# Patient Record
Sex: FEMALE | Race: WHITE | Employment: UNEMPLOYED | URBAN - METROPOLITAN AREA
[De-identification: names, ages, dates, MRNs, and addresses within clinical notes are randomized per-mention and may not be internally consistent; named-entity substitution may affect disease eponyms.]

---

## 2017-02-28 ENCOUNTER — OFFICE VISIT (OUTPATIENT)
Dept: PSYCHIATRY | Facility: CLINIC | Age: 52
End: 2017-02-28
Payer: MEDICAID

## 2017-02-28 DIAGNOSIS — F25.0 SCHIZOAFFECTIVE DISORDER, BIPOLAR TYPE (H): Primary | ICD-10-CM

## 2017-02-28 PROCEDURE — 99215 OFFICE O/P EST HI 40 MIN: CPT | Performed by: PSYCHIATRY & NEUROLOGY

## 2017-02-28 RX ORDER — RISPERIDONE 1 MG/1
TABLET ORAL
Qty: 60 TABLET | Refills: 3 | Status: SHIPPED | OUTPATIENT
Start: 2017-02-28 | End: 2017-07-06

## 2017-02-28 RX ORDER — LAMOTRIGINE 100 MG/1
TABLET ORAL
Qty: 60 TABLET | Refills: 6 | Status: SHIPPED | OUTPATIENT
Start: 2017-02-28 | End: 2017-12-27

## 2017-02-28 RX ORDER — LAMOTRIGINE 25 MG/1
TABLET ORAL
Qty: 42 TABLET | Refills: 0 | Status: SHIPPED | OUTPATIENT
Start: 2017-02-28 | End: 2017-06-07 | Stop reason: DRUGHIGH

## 2017-02-28 ASSESSMENT — ANXIETY QUESTIONNAIRES
6. BECOMING EASILY ANNOYED OR IRRITABLE: SEVERAL DAYS
2. NOT BEING ABLE TO STOP OR CONTROL WORRYING: NEARLY EVERY DAY
GAD7 TOTAL SCORE: 19
7. FEELING AFRAID AS IF SOMETHING AWFUL MIGHT HAPPEN: NEARLY EVERY DAY
3. WORRYING TOO MUCH ABOUT DIFFERENT THINGS: NEARLY EVERY DAY
5. BEING SO RESTLESS THAT IT IS HARD TO SIT STILL: NEARLY EVERY DAY
1. FEELING NERVOUS, ANXIOUS, OR ON EDGE: NEARLY EVERY DAY

## 2017-02-28 ASSESSMENT — PATIENT HEALTH QUESTIONNAIRE - PHQ9: 5. POOR APPETITE OR OVEREATING: NEARLY EVERY DAY

## 2017-02-28 NOTE — PATIENT INSTRUCTIONS
Treatment Plan:  - Drop Latuda (lurasidone) to 40 mg after evening meal for a week, then stop   - Continue Lexapro (escitalopram) 20 mg per day   - Continue Remeron (mirtazepine) 15 mg at bedtime, but try skipping it once in while to see if you have less daytime sedation.   - Start Lamictal 25 mg every other day for 2 weeks, then 25 mg per day for 2 weeks, then to 50 mg or so. Rash information provided.    - After one week of 100 mg Lamictal, go up to 150 mg for a week, then to 200 mg.   - Risperdal (risperidone) 1 mg; 1 to 2 at bedtime   - Continue Inderal (propranolol) to 80 mg XR   - vitamin D 5,000 IU per day and B12  - Behavioral activation was reviewed.   - Call [facility] at [phone] to schedule psychotherapy    - Safety plan was reviewed; to the ER as needed or call after hours crisis line; [phone]  - Education and counseling was done regarding use of medications, psychotherapy options  - Call for a follow up appointment with Dr. Lozada in about 3 months; [phone].

## 2017-02-28 NOTE — MR AVS SNAPSHOT
"                  MRN:[id number]                      After Visit Summary   2017    Tuan Candelaria    MRN: [id number]           Visit Information        Provider Department      2017 12:30 PM Ladarius Lozada MD [facility]        Care Instructions        Treatment Plan:  - Drop Latuda (lurasidone) to 40 mg after evening meal for a week, then stop   - Continue Lexapro (escitalopram) 20 mg per day   - Continue Remeron (mirtazepine) 15 mg at bedtime, but try skipping it once in while to see if you have less daytime sedation.   - Start Lamictal 25 mg every other day for 2 weeks, then 25 mg per day for 2 weeks, then to 50 mg or so. Rash information provided.    - After one week of 100 mg Lamictal, go up to 150 mg for a week, then to 200 mg.   - Risperdal (risperidone) 1 mg; 1 to 2 at bedtime   - Continue Inderal (propranolol) to 80 mg XR   - vitamin D 5,000 IU per day and B12  - Behavioral activation was reviewed.   - Call [facility] at [phone] to schedule psychotherapy    - Safety plan was reviewed; to the ER as needed or call after hours crisis line; [phone]  - Education and counseling was done regarding use of medications, psychotherapy options  - Call for a follow up appointment with Dr. Lozada in about 3 months; [phone].          MyChart Information     [employer] lets you send messages to your doctor, view your test results, renew your prescriptions, schedule appointments and more. To sign up, go to [email or web address] . Click on \"Log in\" on the left side of the screen, which will take you to the Welcome page. Then click on \"Sign up Now\" on the right side of the page.     You will be asked to enter the access code listed below, as well as some personal information. Please follow the directions to create your username and password.     Your access code is: [email or web address]  Expires: 2017  1:00 PM     Your access code will  in 90 days. " If you need help or a new code, please call your [place] clinic or [phone].        Care EveryWhere ID     This is your Care EveryWhere ID. This could be used by other organizations to access your [place] medical records  [id number]
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
## 2017-03-01 ASSESSMENT — ANXIETY QUESTIONNAIRES: GAD7 TOTAL SCORE: 19

## 2017-03-01 ASSESSMENT — PATIENT HEALTH QUESTIONNAIRE - PHQ9: SUM OF ALL RESPONSES TO PHQ QUESTIONS 1-9: 19

## 2017-03-06 ENCOUNTER — TELEPHONE (OUTPATIENT)
Dept: PSYCHIATRY | Facility: CLINIC | Age: 52
End: 2017-03-06

## 2017-03-06 NOTE — TELEPHONE ENCOUNTER
I spoke to Tuna. It seems like her pharmacy has been filling the old prescription of Propanolol 20 mg. She is going to check with the pharmacy and make sure she is taking the XR 80 mg per the treatment plan. She will call back only if she has problems.       From 02/28/2017 med check:  Assessment:  She was doing better on Lamictal and Risperdal; will go back to them   Needs Behavioral activation  Paperwork for [employer] done (can't handle the bus).      Treatment Plan:  - Drop Latuda (lurasidone) to 40 mg after evening meal for a week, then stop   - Continue Lexapro (escitalopram) 20 mg per day   - Continue Remeron (mirtazepine) 15 mg at bedtime, but try skipping it once in while to see if you have less daytime sedation.   - Start Lamictal 25 mg every other day for 2 weeks, then 25 mg per day for 2 weeks, then to 50 mg or so. Rash information provided.   - After one week of 100 mg Lamictal, go up to 150 mg for a week, then to 200 mg.   - Risperdal (risperidone) 1 mg; 1 to 2 at bedtime   - Continue Inderal (propranolol) to 80 mg XR   - vitamin D 5,000 IU per day and B12  - Behavioral activation was reviewed.   - Call [facility] at [phone] to schedule psychotherapy   - Safety plan was reviewed; to the ER as needed or call after hours crisis line; [phone]  - Education and counseling was done regarding use of medications, psychotherapy options  - Call for a follow up appointment with Dr. Lozada in about 3 months; [phone].            Signed: Ladarius Lozada M.D.

## 2017-06-07 ENCOUNTER — OFFICE VISIT (OUTPATIENT)
Dept: PSYCHIATRY | Facility: CLINIC | Age: 52
End: 2017-06-07
Payer: MEDICARE

## 2017-06-07 DIAGNOSIS — F25.0 SCHIZOAFFECTIVE DISORDER, BIPOLAR TYPE (H): Primary | ICD-10-CM

## 2017-06-07 PROCEDURE — 99214 OFFICE O/P EST MOD 30 MIN: CPT | Performed by: PSYCHIATRY & NEUROLOGY

## 2017-06-07 ASSESSMENT — ANXIETY QUESTIONNAIRES
5. BEING SO RESTLESS THAT IT IS HARD TO SIT STILL: MORE THAN HALF THE DAYS
6. BECOMING EASILY ANNOYED OR IRRITABLE: SEVERAL DAYS
2. NOT BEING ABLE TO STOP OR CONTROL WORRYING: NEARLY EVERY DAY
3. WORRYING TOO MUCH ABOUT DIFFERENT THINGS: NEARLY EVERY DAY
GAD7 TOTAL SCORE: 16
7. FEELING AFRAID AS IF SOMETHING AWFUL MIGHT HAPPEN: NEARLY EVERY DAY
1. FEELING NERVOUS, ANXIOUS, OR ON EDGE: MORE THAN HALF THE DAYS

## 2017-06-07 ASSESSMENT — PATIENT HEALTH QUESTIONNAIRE - PHQ9: 5. POOR APPETITE OR OVEREATING: MORE THAN HALF THE DAYS
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
## 2017-06-07 NOTE — PATIENT INSTRUCTIONS
Treatment Plan:  - Continue Lamictal (lamotrigine) 200 mg per day; call us if you are not taking this amount   - Risperdal (risperidone) 1 mg; 1 to 2 at bedtime along with Remeron (mirtazepine) 15 mg   - Continue Inderal (propranolol) to 80 mg XR and Lexapro (escitalopram) 20 mg per day   - vitamin D 5,000 IU per day and B12  - Behavioral activation was reviewed.   - Call [facility] at [phone] to schedule psychotherapy    - Safety plan was reviewed; to the ER as needed or call after hours crisis line; [phone]  - Education and counseling was done regarding use of medications, psychotherapy options  - Call for a follow up appointment with Dr. Lozada in about 3 months; [phone].

## 2017-06-08 ASSESSMENT — ANXIETY QUESTIONNAIRES: GAD7 TOTAL SCORE: 16

## 2017-06-08 ASSESSMENT — PATIENT HEALTH QUESTIONNAIRE - PHQ9: SUM OF ALL RESPONSES TO PHQ QUESTIONS 1-9: 20

## 2017-06-22 DIAGNOSIS — F31.9 BIPOLAR I DISORDER (H): ICD-10-CM

## 2017-06-22 NOTE — TELEPHONE ENCOUNTER
Medication Detail      Disp Refills Start End AVIVA   escitalopram (LEXAPRO) 20 MG tablet 90 tablet 2 10/25/2016  No   Sig: Take 1 tablet (20 mg) by mouth daily One per day   Class: E-Prescribe   Route: Oral   Order: [id number]       [place] [facility] [place]:  6/7/2017        Last PHQ-9 score on record=   PHQ-9 SCORE 6/7/2017   Total Score 20   Some encounter information is confidential and restricted. Go to Review Flowsheets activity to see all data.

## 2017-06-23 RX ORDER — ESCITALOPRAM OXALATE 20 MG/1
TABLET ORAL
Qty: 90 TABLET | Refills: 1 | Status: SHIPPED | OUTPATIENT
Start: 2017-06-23 | End: 2017-09-28

## 2017-07-06 ENCOUNTER — TELEPHONE (OUTPATIENT)
Dept: PSYCHIATRY | Facility: CLINIC | Age: 52
End: 2017-07-06

## 2017-07-06 DIAGNOSIS — F25.0 SCHIZOAFFECTIVE DISORDER, BIPOLAR TYPE (H): ICD-10-CM

## 2017-07-06 RX ORDER — RISPERIDONE 1 MG/1
TABLET ORAL
Qty: 60 TABLET | Refills: 3 | Status: SHIPPED | OUTPATIENT
Start: 2017-07-06 | End: 2017-09-19

## 2017-07-06 NOTE — TELEPHONE ENCOUNTER
RX was sent per RN protocol.         BP Readings from Last 3 Encounters:   09/12/16 110/62   05/02/16 94/59   03/30/16 93/61     Pulse Readings from Last 2 Encounters:   09/12/16 68   05/02/16 68     Lab Results   Component Value Date     07/03/2016     Lab Results   Component Value Date    WBC 4.2 04/29/2016     Lab Results   Component Value Date    RBC 3.74 04/29/2016     Lab Results   Component Value Date    HGB 12.6 04/29/2016     Lab Results   Component Value Date    HCT 37.3 04/29/2016     No components found for: MCT  Lab Results   Component Value Date     04/29/2016     Lab Results   Component Value Date    MCH 33.7 04/29/2016     Lab Results   Component Value Date    MCHC 33.8 04/29/2016     Lab Results   Component Value Date    RDW 13.5 04/29/2016     Lab Results   Component Value Date     04/29/2016     Lab Results   Component Value Date    CHOL 195 11/10/2011     Lab Results   Component Value Date    HDL 68 11/10/2011     Lab Results   Component Value Date    LDL 79 11/10/2011     Lab Results   Component Value Date    TRIG 212 01/15/2015     Lab Results   Component Value Date    CHOLHDLRATIO 3.0 11/10/2011

## 2017-07-06 NOTE — TELEPHONE ENCOUNTER
Reason for call:  Other   Patient called regarding (reason for call): Pharmacy    Additional comments: Calling to check on the status of Risperdal     Phone number to reach patient:  Other phone number:  Pharmacy # listed above    Best Time:  any    Can we leave a detailed message on this number?  YES

## 2017-09-18 ENCOUNTER — TRANSFERRED RECORDS (OUTPATIENT)
Dept: HEALTH INFORMATION MANAGEMENT | Facility: CLINIC | Age: 52
End: 2017-09-18

## 2017-09-18 ENCOUNTER — TELEPHONE (OUTPATIENT)
Dept: PSYCHIATRY | Facility: CLINIC | Age: 52
End: 2017-09-18

## 2017-09-18 DIAGNOSIS — F25.0 SCHIZOAFFECTIVE DISORDER, BIPOLAR TYPE (H): ICD-10-CM

## 2017-09-18 DIAGNOSIS — F31.9 BIPOLAR I DISORDER (H): ICD-10-CM

## 2017-09-18 LAB — HEP C HIM: NORMAL

## 2017-09-18 NOTE — TELEPHONE ENCOUNTER
Reason for call:  Other   Patient called regarding (reason for call): prescription  Additional comments: [facility] is wanting a refill.  Please call them back.       Phone number to reach patient:  Other phone number:  [phone]    Best Time:  ASAP     Can we leave a detailed message on this number?  YES

## 2017-09-19 RX ORDER — HYDROXYZINE HYDROCHLORIDE 25 MG/1
TABLET, FILM COATED ORAL
Qty: 75 TABLET | Refills: 2 | Status: SHIPPED | OUTPATIENT
Start: 2017-09-19 | End: 2018-08-27

## 2017-09-19 RX ORDER — MIRTAZAPINE 15 MG/1
15 TABLET, FILM COATED ORAL AT BEDTIME
Qty: 90 TABLET | Refills: 0 | Status: SHIPPED | OUTPATIENT
Start: 2017-09-19 | End: 2017-12-27

## 2017-09-19 RX ORDER — RISPERIDONE 1 MG/1
TABLET ORAL
Qty: 60 TABLET | Refills: 2 | Status: SHIPPED | OUTPATIENT
Start: 2017-09-19

## 2017-09-19 RX ORDER — ESCITALOPRAM OXALATE 20 MG/1
20 TABLET ORAL DAILY
Qty: 90 TABLET | Refills: 0 | Status: SHIPPED | OUTPATIENT
Start: 2017-09-19 | End: 2017-12-27

## 2017-09-20 LAB
ALT SERPL-CCNC: 148 IU/L (ref 12–68)
AST SERPL-CCNC: 23 IU/L (ref 12–37)

## 2017-09-21 LAB
CREAT SERPL-MCNC: 1.9 MG/DL (ref 0.55–1.02)
GFR SERPL CREATININE-BSD FRML MDRD: 52 ML/MIN/1.73M2
GLUCOSE SERPL-MCNC: 88 MG/DL (ref 74–106)
POTASSIUM SERPL-SCNC: 4.7 MMOL/L (ref 3.5–5.1)

## 2017-09-22 ENCOUNTER — TELEPHONE (OUTPATIENT)
Dept: FAMILY MEDICINE | Facility: CLINIC | Age: 52
End: 2017-09-22

## 2017-09-22 NOTE — TELEPHONE ENCOUNTER
"ED/Discharge Protocol    \"Hi, my name is Rachelle Edwards Oscar, a registered nurse, and I am calling on behalf of Dr. Vences's office at [place].  I am calling to follow up and see how things are going for you after your recent visit.\"    \"I see that you were in the (ER/UC/IP) on 9/21.    How are you doing now that you are home?\" \"not doing well, but is taking medications as prescribed, she is scared that her delusions will come back.    Is patient experiencing symptoms that may require a hospital visit?  no    Discharge Instructions    \"Let's review your discharge instructions.  What is/are the follow-up recommendations?  Pt. Response: see PCP    \"Were you instructed to make a follow-up appointment?\"  Pt. Response: Yes.  Has appointment been made?   Yes      \"When you see the provider, I would recommend that you bring your discharge instructions with you.    Medications    \"How many new medications are you on since your hospitalization/ED visit?\"    Patient does not know - [facility] MTM referral needed  \"How many of your current medicines changed (dose, timing, name, etc.) while you were in the hospital/ED visit?\"   Patient does not know - Epic MTM referral needed  \"Do you have questions about your medications?\"   Yes (and cannot be easily answered) - Epic MTM referral needed  \"Were you newly diagnosed with heart failure, COPD, diabetes or did you have a heart attack?\"   No  For patients on insulin: \"Did you start on insulin in the hospital or did you have your insulin dose changed?\"   No    Medication reconciliation completed? Attempted, but patient unable to complete on phone - Epic MTM Referral needed     Was MTM referral placed (*Make sure to put transitions as reason for referral)?   Yes - \"The Medication Therapy  will call you within the next few days to schedule an appointment with an MTM pharmacist to discuss your medicines and make sure they are working as well as they possibly can for you. " "This visit can be done in a [place] clinic or on the phone and is at no charge to you.\"    Call Summary    \"Do you have any questions or concerns about your condition or care plan at the moment?\"    No  Triage nurse advice given: call with concerns    Patient was in ER 1 in the past year (assess appropriateness of ER visits.)      \"If you have questions or things don't continue to improve, we encourage you contact us through the main clinic number, [phone].  Even if the clinic is not open, triage nurses are available 24/7 to help you.     We would like you to know that our clinic has extended hours (provide information).  We also have urgent care (provide details on closest location and hours/contact info)\"      \"Thank you for your time and take care!\"          "

## 2017-09-28 ENCOUNTER — OFFICE VISIT (OUTPATIENT)
Dept: PHARMACY | Facility: CLINIC | Age: 52
End: 2017-09-28
Payer: MEDICAID

## 2017-09-28 ENCOUNTER — OFFICE VISIT (OUTPATIENT)
Dept: FAMILY MEDICINE | Facility: CLINIC | Age: 52
End: 2017-09-28
Payer: MEDICARE

## 2017-09-28 VITALS
HEIGHT: 67 IN | TEMPERATURE: 98.5 F | WEIGHT: 173 LBS | HEART RATE: 71 BPM | OXYGEN SATURATION: 99 % | SYSTOLIC BLOOD PRESSURE: 106 MMHG | DIASTOLIC BLOOD PRESSURE: 64 MMHG | BODY MASS INDEX: 27.15 KG/M2

## 2017-09-28 DIAGNOSIS — F25.1 SCHIZOAFFECTIVE DISORDER, DEPRESSIVE TYPE (H): Primary | ICD-10-CM

## 2017-09-28 DIAGNOSIS — Z23 NEED FOR PROPHYLACTIC VACCINATION AND INOCULATION AGAINST INFLUENZA: ICD-10-CM

## 2017-09-28 DIAGNOSIS — N17.9 ACUTE RENAL FAILURE, UNSPECIFIED ACUTE RENAL FAILURE TYPE (H): Primary | ICD-10-CM

## 2017-09-28 DIAGNOSIS — E83.59 HYPOCALCIURIA: ICD-10-CM

## 2017-09-28 DIAGNOSIS — D64.9 ANEMIA, UNSPECIFIED TYPE: ICD-10-CM

## 2017-09-28 DIAGNOSIS — E83.51 HYPOCALCEMIA: ICD-10-CM

## 2017-09-28 DIAGNOSIS — E56.9 VITAMIN DEFICIENCY: ICD-10-CM

## 2017-09-28 DIAGNOSIS — F25.1 SCHIZOAFFECTIVE DISORDER, DEPRESSIVE TYPE (H): ICD-10-CM

## 2017-09-28 LAB
ERYTHROCYTE [DISTWIDTH] IN BLOOD BY AUTOMATED COUNT: 15.3 % (ref 10–15)
HCT VFR BLD AUTO: 33.7 % (ref 35–47)
HGB BLD-MCNC: 11 G/DL (ref 11.7–15.7)
MCH RBC QN AUTO: 32.1 PG (ref 26.5–33)
MCHC RBC AUTO-ENTMCNC: 32.6 G/DL (ref 31.5–36.5)
MCV RBC AUTO: 98 FL (ref 78–100)
PLATELET # BLD AUTO: 178 10E9/L (ref 150–450)
PTH-INTACT SERPL-MCNC: 91 PG/ML (ref 12–72)
RBC # BLD AUTO: 3.43 10E12/L (ref 3.8–5.2)
WBC # BLD AUTO: 3.7 10E9/L (ref 4–11)

## 2017-09-28 PROCEDURE — G0008 ADMIN INFLUENZA VIRUS VAC: HCPCS | Performed by: FAMILY MEDICINE

## 2017-09-28 PROCEDURE — 99606 MTMS BY PHARM EST 15 MIN: CPT | Performed by: PHARMACIST

## 2017-09-28 PROCEDURE — 85027 COMPLETE CBC AUTOMATED: CPT | Performed by: FAMILY MEDICINE

## 2017-09-28 PROCEDURE — 36415 COLL VENOUS BLD VENIPUNCTURE: CPT | Performed by: FAMILY MEDICINE

## 2017-09-28 PROCEDURE — 99607 MTMS BY PHARM ADDL 15 MIN: CPT | Performed by: PHARMACIST

## 2017-09-28 PROCEDURE — 90686 IIV4 VACC NO PRSV 0.5 ML IM: CPT | Performed by: FAMILY MEDICINE

## 2017-09-28 PROCEDURE — 80053 COMPREHEN METABOLIC PANEL: CPT | Performed by: FAMILY MEDICINE

## 2017-09-28 PROCEDURE — 83970 ASSAY OF PARATHORMONE: CPT | Performed by: FAMILY MEDICINE

## 2017-09-28 PROCEDURE — 99495 TRANSJ CARE MGMT MOD F2F 14D: CPT | Mod: 25 | Performed by: FAMILY MEDICINE

## 2017-09-28 RX ORDER — CALCIUM CARBONATE 500 MG/1
1 TABLET, CHEWABLE ORAL DAILY
COMMUNITY

## 2017-09-28 NOTE — MR AVS SNAPSHOT
After Visit Summary   9/28/2017    Tuan Candelaria    MRN: [id number]           Patient Information     Date Of Birth          1965        Visit Information        Provider Department      9/28/2017 1:00 PM Theresa Salazar DO [facility]        Today's Diagnoses     Acute renal failure, unspecified acute renal failure type (H)    -  1    Anemia, unspecified type        Hypocalciuria        Need for prophylactic vaccination and inoculation against influenza        Schizoaffective disorder, depressive type (H)          Care Instructions    Eat when you wake up, and try to eat 3 meals a day.     Grocery shop today, try to walk Monday, Wednesday, and Friday. Try to begin a schedule.     Follow up in 2 weeks for recheck.             Follow-ups after your visit        Your next 10 appointments already scheduled     Oct 13, 2017 10:00 AM CDT   Return Visit with Ladarius Lozada MD   [facility] ([facility])    [address] [facility] [address] [phone]              Who to contact     If you have questions or need follow up information about today's clinic visit or your schedule please contact [facility] directly at [phone].  Normal or non-critical lab and imaging results will be communicated to you by MyChart, letter or phone within 4 business days after the clinic has received the results. If you do not hear from us within 7 days, please contact the clinic through MyChart or phone. If you have a critical or abnormal lab result, we will notify you by phone as soon as possible.  Submit refill requests through [employer] or call your pharmacy and they will forward the refill request to us. Please allow 3 business days for your refill to be completed.          Additional Information About Your Visit        [employer] Information     [employer] lets you send messages to your doctor, view your test  "results, renew your prescriptions, schedule appointments and more. To sign up, go to [email or web address] . Click on \"Log in\" on the left side of the screen, which will take you to the Welcome page. Then click on \"Sign up Now\" on the right side of the page.     You will be asked to enter the access code listed below, as well as some personal information. Please follow the directions to create your username and password.     Your access code is: [email or web address]  Expires: 2017  3:25 PM     Your access code will  in 90 days. If you need help or a new code, please call your [facility] clinic or [phone].        Care EveryWhere ID     This is your Care EveryWhere ID. This could be used by other organizations to access your [facility] medical records  [id number]        Your Vitals Were     Pulse Temperature Height Pulse Oximetry BMI (Body Mass Index)       71 98.5  F (36.9  C) (Oral) 5' 7\" (1.702 m) 99% 27.1 kg/m2        Blood Pressure from Last 3 Encounters:   17 106/64   16 110/62   16 (!) 173/107    Weight from Last 3 Encounters:   17 173 lb (78.5 kg)   16 148 lb 8 oz (67.4 kg)   16 164 lb 9.6 oz (74.7 kg)              We Performed the Following     ADMIN INFLUENZA (For MEDICARE Patients ONLY) []     CBC with platelets     Comprehensive metabolic panel     FLU VAC, SPLIT VIRUS IM > 3 YO (QUADRIVALENT) [44680]     Parathyroid Hormone Intact        Primary Care Provider Office Phone # Fax #    Theresa DO Martin [phone] [address] [facility] [address]        Goals        General    I will attend an Emotions Anonymous mtg within the next 2 weeks  (pt-stated)     Notes - Note edited  2015  5:34 PM by Tameka Negrete    As of today's date 2015 goal is met at 0 - 25%.   Goal Status:  Discontinued    Patient was discharged from [facility] to [facility] in [place], [place]   Tameka " JORDI Kay, [facility]   [phone]  1/8/2015 5:34 PM          I will call Emotions Anonymous today to inquire on other meeting locations  (pt-stated)     Notes - Note edited  1/8/2015  5:33 PM by Tameka Negrete    As of today's date 1/8/2015 goal is met at 26 - 50%.   Goal Status:  Discontinued  Patient was discharged from [facility] ED to [facility] program in [place], [place]   JORDI Stanton, [facility]   [phone]  1/8/2015 5:33 PM        I will read three times weekly or ten minutes  (pt-stated)     Notes - Note edited  1/8/2015  5:33 PM by Tameka Negrete    As of today's date 1/8/2015 goal is met at 0 - 25%.   Goal Status:  Discontinued  Patient was discharged from [facility] ED to [facility] program in [place], [place]   JORDI Stanton, [facility]   [phone]  1/8/2015 5:33 PM          I will schedule appointments within the next week with clinic and Dr. Lozada  (pt-stated)     Notes - Note edited  1/8/2015  5:32 PM by Tameka Negrete    As of today's date 1/8/2015 goal is met at 0 - 25%.   Goal Status:  Discontinued  Patient was discharged from [facility] ED to [facility] program in [place], [place]   JORDI Stanton, [facility]   [phone]  1/8/2015 5:32 PM        I will spend time weekly with friends for emotional support   (pt-stated)     Notes - Note edited  1/8/2015  5:32 PM by Tameka Negrete    As of today's date 1/8/2015 goal is met at 26 - 50%.   Goal Status:  Discontinued  Patient was discharged from [facility] to [facility] program in [place], [place]     JORDI Stanton, [facility]   [phone]  1/8/2015 5:32 PM              I will work on [employer] application with friend  (pt-stated)     Notes - Note created  6/29/2016 11:39 AM by Tameka Negrete    As of today's date 6/29/2016 goal is met at 0 - 25%.    Goal Status:  Active        Equal Access to Services     [facility]: Hadii aad yuniel erika Aroraali, wailyada luqrobinha, qaybta kaalmamanuel pierson. So [facility] [phone].    ATENCIÓN: Si habla español, tiene a dotson disposición servicios gratuitos de asistencia lingüística. Ugo al [phone].    We comply with applicable federal civil rights laws and [place] laws. We do not discriminate on the basis of race, color, national origin, age, disability, sex, sexual orientation, or gender identity.            Thank you!     Thank you for choosing [facility]  for your care. Our goal is always to provide you with excellent care. Hearing back from our patients is one way we can continue to improve our services. Please take a few minutes to complete the written survey that you may receive in the mail after your visit with us. Thank you!             Your Updated Medication List - Protect others around you: Learn how to safely use, store and throw away your medicines at www.disposemymeds.org.          This list is accurate as of: 9/28/17 11:59 PM.  Always use your most recent med list.                   Brand Name Dispense Instructions for use Diagnosis    CALCITRIOL PO      Take 0.25 mcg by mouth daily        calcium carbonate 500 MG chewable tablet    TUMS     Take 1 chew tab by mouth daily        cholecalciferol 5000 UNITS Caps capsule    vitamin D3    100 capsule    Take 1 capsule (5,000 Units) by mouth daily Take 1 per day    Unspecified psychosis       cyabnocobalamin 2500 MCG sublingual tablet    VITAMIN B-12    90 tablet    Place 2,500 mcg under the tongue daily    Vitamin B12 deficiency (non anemic)       escitalopram 20 MG tablet    LEXAPRO    90 tablet    Take 1 tablet (20 mg) by mouth daily One per day    Bipolar I disorder (H)       hydrOXYzine 25 MG tablet    ATARAX    75 tablet    Take one to 2 tablets every 6 hours as needed for anxiety     Schizoaffective disorder, bipolar type (H)       lamoTRIgine 100 MG tablet    LaMICtal    60 tablet    Take 1 per day for a week, then 1 and 1/2 per day for a week, then 2 per day    Schizoaffective disorder, bipolar type (H)       mirtazapine 15 MG tablet    REMERON    90 tablet    Take 1 tablet (15 mg) by mouth At Bedtime    Schizoaffective disorder, bipolar type (H)       * order for DME     1 each    Glucometer, brand as covered by insurance.    Elevated blood sugar       * order for DME     100 each    Test strips for pt's glucometer, brand as covered by insurance. Test daily and prn.    Elevated blood sugar       * order for DME     100 each    Lancets.  Daily and prn.    Elevated blood sugar       propranolol 80 MG 24 hr capsule    INDERAL LA    90 capsule    Take 1 capsule (80 mg) by mouth daily    Schizoaffective disorder, bipolar type (H)       QUETIAPINE FUMARATE PO      Take 50 mg by mouth At Bedtime        risperiDONE 1 MG tablet    risperDAL    60 tablet    1 to 2 at bedtime as needed    Schizoaffective disorder, bipolar type (H)       SODIUM BICARBONATE PO      Take 325 mg by mouth 2 times daily        * Notice:  This list has 3 medication(s) that are the same as other medications prescribed for you. Read the directions carefully, and ask your doctor or other care provider to review them with you.

## 2017-09-28 NOTE — PROGRESS NOTES
SUBJECTIVE:   Tuan Candelaria is a 52 year old female who presents to clinic today for the following health issues:          Hospital Follow-up Visit:    Hospital/Nursing Home/IP Rehab Facility: [facility]   Date of Admission: 09/18/2017  Date of Discharge:  09/21/2017  Reason(s) for Admission: Renal Failure            Problems taking medications regularly:  None       Medication changes since discharge: Yes- patient has not started them all yet, not sure which ones.  She is seeing [facility] pharm after this appointment           Problems adhering to non-medication therapy:  None    Summary of hospitalization:  [facility] discharge summary reviewed    ASSESSMENT:  Active Problems:  ARF (acute renal failure) ([facility])  Dehydration  Delirium  Alcohol dependence in remission ([facility])  Cognitive disorder    PLAN :     1. Acute delirium.     Likely due to mulitple metabolic problems including acute kidney injury and hypocalcemia on top pre-exting bipolar disorder. Was seen by psychiatry and held Remeron and Lexapro and discontinued Ativan which may have contributed to her confusion. Continued p.r.n. Vistaril and Zyprexa Zydis and give her Seroquel at bedtime.  2. Acute renal failure.    Etiology unclear but patient did mention to her friendThat she had taken 5 tablets of ibuprofen but unclear how long she has been taking that.Creatinine was 5.4 on admission and has improved to 4.29.    Was seen by nephrology and renal ultrasound was done which suggested incomplete bladder emptying and post void residual was ordered and if high will need a Maya catheter.    No other etiology was seen. Lithium level is normal, drug screen is negative, no peripheral eosinophilia or rash to suggest interstitial nephritis. Has history of bariatric surgery so oxalate nephropathy is possible.  3. Severe hypocalcemia.    Total calcium was 5.7 today with an ionized calcium of 0.85. Phosphorous is within normal limits. Was given calcium  gluconate 2 g and it improved to 0.93. Also started on Tums.    PTH was 554 suggesting secondary hyperparathyroidism which may be due to renal failure.    Vitamin D level was 0.9 and was started on vitamin D3.    Placed on telemetry due to severe hypocalcemia.  4. Non-anion gap metabolic acidosis.    Secondary to acute kidney injury. Started on bicarbonate replacement orally.  5. Transaminitis.    Patient's alanine transaminase is 306 with normal AST and bilirubin. She has history of alcohol abuse but per her friend she has not taken alcohol for a long time and pattern is not consistent with alcoholic liver disease. Hepatitis panel was negative. We will monitor her liver counts.      CareEverywhere information obtained and reviewed  Diagnostic Tests/Treatments reviewed.  Follow up needed: labs, CBC, CMP, PTH  Other Healthcare Providers Involved in Patient s Care:         None  Update since discharge: improved.     Post Discharge Medication Reconciliation: unable to reconcile discharge medications due to patient not knowing what she is on.  Plan of care communicated with patient     Coding guidelines for this visit:  Type of Medical   Decision Making Face-to-Face Visit       within 7 Days of discharge Face-to-Face Visit        within 14 days of discharge   Moderate Complexity [id number]   High Complexity 94113 [id number]          She states that she has not been drinking, not for a long time. She believes that ibuprofen could be the cause of her renal failure. She says she was falling all over the place when this happened. She doesn't quite remember how long she was on the ground before she was found. She was anemic and had low calcium when she was in the hospital. They gave her IV fluids, so this was most likely dilutional. In the hospital they added Calcitrol and calcium carbonate in the hospital. She was hallucinating in the hospital.   She is seeing a psychiatrist right now, but because of where she is living she is  "going to change her psychiatrist.     Patient states that she is not feeling good. She feels very unstable, and she sleeps a lot. She says that she has gained weight, but she has not been eating like she should. She wakes up around 11am-12 pm. She says she usually doesn't eat until 5-6 pm. She says she stays in bed all day watching tv as she is home alone during the day. That is the only time she eats during the day. She is too uncomfortable at her friend's house where she is staying to eat her food. She says she feels guilty eating her food.     Problem list and histories reviewed & adjusted, as indicated.  Additional history: as documented    BP Readings from Last 3 Encounters:   09/28/17 106/64   09/12/16 110/62   05/12/16 (!) 173/107    Wt Readings from Last 3 Encounters:   09/28/17 173 lb (78.5 kg)   09/12/16 148 lb 8 oz (67.4 kg)   05/12/16 164 lb 9.6 oz (74.7 kg)                      Reviewed and updated as needed this visit by clinical staff  Tobacco  Allergies  Meds  Med Hx  Surg Hx  Fam Hx  Soc Hx      Reviewed and updated as needed this visit by Provider         ROS:  Constitutional, HEENT, cardiovascular, pulmonary, gi and gu systems are negative, except as otherwise noted.    This document serves as a record of the services and decisions personally performed by CHAPIS SCHMIDT. It was created on his/her behalf by Sobeida Mohan, a trained medical scribe. The creation of this document is based on the provider's statements to the medical scribe. Sobeida Mohan, September 28, 2017 1:19 PM    OBJECTIVE:   /64 (BP Location: Right arm, Cuff Size: Adult Regular)  Pulse 71  Temp 98.5  F (36.9  C) (Oral)  Ht 5' 7\" (1.702 m)  Wt 173 lb (78.5 kg)  SpO2 99%  BMI 27.1 kg/m2  Body mass index is 27.1 kg/(m^2).  GENERAL: healthy, alert and no distress  EYES: Eyes grossly normal to inspection, PERRL and conjunctivae and sclerae normal  HENT: ear canals and TM's normal, nose and mouth without ulcers or " lesions  NECK: no adenopathy, no asymmetry, masses, or scars and thyroid normal to palpation  RESP: lungs clear to auscultation - no rales, rhonchi or wheezes  BREAST: normal without masses, tenderness or nipple discharge and no palpable axillary masses or adenopathy  CV: regular rate and rhythm, normal S1 S2, no S3 or S4, no murmur, click or rub, no peripheral edema and peripheral pulses strong  ABDOMEN: soft, nontender, no hepatosplenomegaly, no masses and bowel sounds normal  MS: no gross musculoskeletal defects noted, no edema  SKIN: no suspicious lesions or rashes  NEURO: Normal strength and tone, mentation intact and speech normal  PSYCH: mentation appears normal, affect normal/bright    Diagnostic Test Results:  No results found for this or any previous visit (from the past 24 hour(s)).    ASSESSMENT/PLAN:       ICD-10-CM    1. Acute renal failure, unspecified acute renal failure type (H) N17.9 Comprehensive metabolic panel   2. Anemia, unspecified type D64.9 CBC with platelets   3. Hypocalciuria E83.59 Parathyroid Hormone Intact     Comprehensive metabolic panel   4. Need for prophylactic vaccination and inoculation against influenza Z23 FLU VAC, SPLIT VIRUS IM > 3 YO (QUADRIVALENT) [13940]     ADMIN INFLUENZA (For MEDICARE Patients ONLY) []   5. Schizoaffective disorder, depressive type (H) F25.1        I advised the patient to try to get 3 meals a day as I am concerned she is not eating enough which could be causing her deficiencies. I recommended she try to begin a schedule with walking and grocery shopping. Will repeat labs today.  Follow up with [facility] to clarify prescriptions today.  She will follow up in 2 weeks for a recheck.      Patient Instructions   Eat when you wake up, and try to eat 3 meals a day.     Grocery shop today, try to walk Monday, Wednesday, and Friday. Try to begin a schedule.     Follow up in 2 weeks for recheck.         Theresa Salazar DO  [facility]    The  information in this document, created by the medical scribhenrry Mohan for me, accurately reflects the services I personally performed and the decisions made by me. I have reviewed and approved this document for accuracy prior to leaving the patient care area.

## 2017-09-28 NOTE — MR AVS SNAPSHOT
"              After Visit Summary   9/28/2017    Tuan Candelaria    MRN: [id number]           Patient Information     Date Of Birth          1965        Visit Information        Provider Department      9/28/2017 1:30 PM Kaya Jones RPH [facility]        Today's Diagnoses     Schizoaffective disorder, depressive type (H)    -  1    Vitamin deficiency        Hypocalcemia           Follow-ups after your visit        Your next 10 appointments already scheduled     Oct 13, 2017 10:00 AM CDT   Return Visit with Ladarius Lozada MD   [facility] ([facility])    [address] [facility] [place] [address] [phone]              Who to contact     If you have questions or need follow up information about today's clinic visit or your schedule please contact [facility] directly at [phone].  Normal or non-critical lab and imaging results will be communicated to you by MyChart, letter or phone within 4 business days after the clinic has received the results. If you do not hear from us within 7 days, please contact the clinic through MyChart or phone. If you have a critical or abnormal lab result, we will notify you by phone as soon as possible.  Submit refill requests through [employer] or call your pharmacy and they will forward the refill request to us. Please allow 3 business days for your refill to be completed.          Additional Information About Your Visit        MyChart Information     [employer] lets you send messages to your doctor, view your test results, renew your prescriptions, schedule appointments and more. To sign up, go to [email or web address] . Click on \"Log in\" on the left side of the screen, which will take you to the Welcome page. Then click on \"Sign up Now\" on the right side of the page.     You will be asked to enter the access code listed below, as well as some personal information. " Please follow the directions to create your username and password.     Your access code is: [email or web address]  Expires: 2017  3:25 PM     Your access code will  in 90 days. If you need help or a new code, please call your [facility] clinic or [phone].        Care EveryWhere ID     This is your Care EveryWhere ID. This could be used by other organizations to access your [facility] medical records  [id number]         Blood Pressure from Last 3 Encounters:   17 106/64   16 110/62   16 94/59    Weight from Last 3 Encounters:   17 173 lb (78.5 kg)   16 148 lb 8 oz (67.4 kg)   16 158 lb (71.7 kg)              Today, you had the following     No orders found for display       Primary Care Provider Office Phone # Fax #    Theresa Salazar [phone]       [address] [place] [address]        Goals        General    I will attend an Emotions Anonymous mtg within the next 2 weeks  (pt-stated)     Notes - Note edited  2015  5:34 PM by Tameka Negrete    As of today's date 2015 goal is met at 0 - 25%.   Goal Status:  Discontinued    Patient was discharged from [facility] ED to [facility] Intensive Residential Treatment program in [place], [place]   JORDI Stanton, MSW   [phone]  2015 5:34 PM          I will call [email or web address] today to inquire on other meeting locations  (pt-stated)     Notes - Note edited  2015  5:33 PM by Tameka Negrete    As of today's date 2015 goal is met at 26 - 50%.   Goal Status:  Discontinued  Patient was discharged from [facility] ED to [facility] Residential Treatment program in [place], [place]   JORDI Stanton, MSW   [phone]  2015 5:33 PM        I will read three times weekly or ten minutes  (pt-stated)     Notes - Note edited  2015  5:33 PM by Tameka Negrete    As of today's date 2015 goal is met at 0 - 25%.   Goal Status:   Discontinued  Patient was discharged from [facility] ED to [facility] Intensive Residential Treatment program in [place], [place]   BRANDYN Stanton, MS   [phone]  1/8/2015 5:33 PM          I will schedule appointments within the next week with clinic and Dr. Lozada  (pt-stated)     Notes - Note edited  1/8/2015  5:32 PM by Tameka Negrete    As of today's date 1/8/2015 goal is met at 0 - 25%.   Goal Status:  Discontinued  Patient was discharged from [facility] ED to [facility] Intensive Residential Treatment program in [place], [place]   JORDI Stanton, MS   [phone]  1/8/2015 5:32 PM        I will spend time weekly with friends for emotional support   (pt-stated)     Notes - Note edited  1/8/2015  5:32 PM by Tameka Negrete    As of today's date 1/8/2015 goal is met at 26 - 50%.   Goal Status:  Discontinued  Patient was discharged from [facility] ED to [facility] Intensive Residential Treatment program in [place], [place]     JORDI Stanton, MS   [phone]  1/8/2015 5:32 PM              I will work on [employer] application with friend  (pt-stated)     Notes - Note created  6/29/2016 11:39 AM by Tameka Negrete    As of today's date 6/29/2016 goal is met at 0 - 25%.   Goal Status:  Active        Equal Access to Services     [facility]: Hadii aad ku hadasho Soomaali, waaxda luqadaha, qaybta kaalmada adeegyada, waxay car orozco. So [facility] [phone].    ATENCIÓN: Si habla español, tiene a dotson disposición servicios gratuitos de asistencia lingüística. Llame al [phone].    We comply with applicable federal civil rights laws and [place] laws. We do not discriminate on the basis of race, color, national origin, age, disability sex, sexual orientation or gender identity.            Thank you!     Thank you for choosing [facility]  for your care. Our goal is always to provide you with excellent care.  Hearing back from our patients is one way we can continue to improve our services. Please take a few minutes to complete the written survey that you may receive in the mail after your visit with us. Thank you!             Your Updated Medication List - Protect others around you: Learn how to safely use, store and throw away your medicines at www.disposemymeds.org.          This list is accurate as of: 9/28/17  3:25 PM.  Always use your most recent med list.                   Brand Name Dispense Instructions for use Diagnosis    CALCITRIOL PO      Take 0.25 mcg by mouth daily        calcium carbonate 500 MG chewable tablet    TUMS     Take 1 chew tab by mouth daily        cholecalciferol 5000 UNITS Caps capsule    vitamin D3    100 capsule    Take 1 capsule (5,000 Units) by mouth daily Take 1 per day    Unspecified psychosis       cyabnocobalamin 2500 MCG sublingual tablet    VITAMIN B-12    90 tablet    Place 2,500 mcg under the tongue daily    Vitamin B12 deficiency (non anemic)       escitalopram 20 MG tablet    LEXAPRO    90 tablet    Take 1 tablet (20 mg) by mouth daily One per day    Bipolar I disorder (H)       hydrOXYzine 25 MG tablet    ATARAX    75 tablet    Take one to 2 tablets every 6 hours as needed for anxiety    Schizoaffective disorder, bipolar type (H)       lamoTRIgine 100 MG tablet    LaMICtal    60 tablet    Take 1 per day for a week, then 1 and 1/2 per day for a week, then 2 per day    Schizoaffective disorder, bipolar type (H)       mirtazapine 15 MG tablet    REMERON    90 tablet    Take 1 tablet (15 mg) by mouth At Bedtime    Schizoaffective disorder, bipolar type (H)       * order for DME     1 each    Glucometer, brand as covered by insurance.    Elevated blood sugar       * order for DME     100 each    Test strips for pt's glucometer, brand as covered by insurance. Test daily and prn.    Elevated blood sugar       * order for DME     100 each    Lancets.  Daily and prn.    Elevated blood  sugar       propranolol 80 MG 24 hr capsule    INDERAL LA    90 capsule    Take 1 capsule (80 mg) by mouth daily    Schizoaffective disorder, bipolar type (H)       QUETIAPINE FUMARATE PO      Take 50 mg by mouth At Bedtime        risperiDONE 1 MG tablet    risperDAL    60 tablet    1 to 2 at bedtime as needed    Schizoaffective disorder, bipolar type (H)       SODIUM BICARBONATE PO      Take 325 mg by mouth 2 times daily        * Notice:  This list has 3 medication(s) that are the same as other medications prescribed for you. Read the directions carefully, and ask your doctor or other care provider to review them with you.

## 2017-09-28 NOTE — PROGRESS NOTES
Injectable Influenza Immunization Documentation    1.  Is the person to be vaccinated sick today?   No    2. Does the person to be vaccinated have an allergy to a component   of the vaccine?   No    3. Has the person to be vaccinated ever had a serious reaction   to influenza vaccine in the past?   No    4. Has the person to be vaccinated ever had Guillain-Barré syndrome?   No    Form completed by Genie Mckee, Certified Medical Assistant (AAMA)

## 2017-09-28 NOTE — NURSING NOTE
"Chief Complaint   Patient presents with     Hospital F/U     Flu Shot       Initial /64 (BP Location: Right arm, Cuff Size: Adult Regular)  Pulse 71  Temp 98.5  F (36.9  C) (Oral)  Ht 5' 7\" (1.702 m)  Wt 173 lb (78.5 kg)  SpO2 99%  BMI 27.1 kg/m2 Estimated body mass index is 27.1 kg/(m^2) as calculated from the following:    Height as of this encounter: 5' 7\" (1.702 m).    Weight as of this encounter: 173 lb (78.5 kg).  Medication Reconciliation: incomplete     Saira Reynaga CMA (AAMA)      "

## 2017-09-28 NOTE — Clinical Note
FYI - patient's medications for schizoaffective disorder were stopped (except lamotrigine) upon discharge.  I have contacted her psychiatrist for next steps.

## 2017-09-28 NOTE — Clinical Note
"Patient was discharged from [facility] on 9/21/17 after an LANA.  [facility] stopped escitalopram, mirtazapine, hydroxyzine and risperidal.  They continued lamotrigine and started quetiapine.  Tuan endorses \"tall men\" in her doorway and seeing \"kittens\" in her room recently. Advised that she resume meds prescribed by you.  Any help is appreciated."

## 2017-09-28 NOTE — PATIENT INSTRUCTIONS
Eat when you wake up, and try to eat 3 meals a day.     Grocery shop today, try to walk Monday, Wednesday, and Friday. Try to begin a schedule.     Follow up in 2 weeks for recheck.

## 2017-09-28 NOTE — PROGRESS NOTES
"SUBJECTIVE/OBJECTIVE:                Tuan Candelaria is a 52 year old female coming in for a transitions of care visit.  She was discharged from [facility] on 9/21/17 for acute kidney injury secondary to poor intake and possible ibuprofen insult.   Tuan is unclear about many of the things that happened during her hospitalization and isn't able to share a lot of information with me.      Chief Complaint: Elyse.    Allergies/ADRs: Reviewed in Epic  Tobacco: No tobacco use     Alcohol: not currently using  Caffeine: no caffeine  Activity: Limited due to mental health  PMH: Reviewed in Epic    Medication Adherence: no issues reported and has assistance setting up med boxes    Schizoaffective Disorder, Bipolar Type: Current medications include hydroxyzine 25 mg 1-2 every 6 hours as needed, lamotrigine 200 mg daily, propranolol LA 80 mg daily, risperidone 1 mg 1-2 tablets at bedtime as needed, mirtazapine 15 mg at bedtime and escitalopram 20 mg daily. All of these medications but lamotrigine were stopped by [facility] upon discharge from the hospital.  She is really concerned about her delusions currently and describes seeing \"tall men\" at her doorway and \"kittens, I told a little girl that she looked just like a puppy\".  However, she scoffs when she repeats these things to me, as if to indicate that she knows they aren't really there.  She is also very concerned about being discharged.  She is unsure about what she should be taking and why changes were made.     Upon discharge, quetiapine 50 mg at bedtime was started.  We review the concerns with taking risperidone and quetiapine at the same time.     Labs from discharge at [facility] are as follows:    9/21/17  NA   151  K      4.7  Cl     125  CO2 18  BUN  24  SCr   1.09  GFR  52  Gluc  88  Ca, s 7.5  AG     8    Vitamin Deficiency: Current medications include vitamin D 5000 units daily and vitamin B12 2500 mcg daily. She is not taking these at this time. "     Hypocalcemia: This was discovered in the hospital and is likely secondary to poor intake.  She is currently living with her friend Griselda and she helps with her medications. She doesn't like to eat the food in Griselda's house because it is not her house.  She doesn't feel comfortable helping herself to her friend's kitchen.  She was started on Tums 2 tabs twice daily to help boost calcium.  She was also started on Sodium Bicarbonate tablets.  It is unclear why; her Na on discharge was 151.  Calcitriol 0.25 daily was also started upon discharge along with the other supplements.     Current labs include:  BP Readings from Last 3 Encounters:   09/28/17 106/64   09/12/16 110/62   05/02/16 94/59     Today's Vitals: There were no vitals taken for this visit. - saw PCP immediately prior to our visit    Lab Results   Component Value Date    A1C 5.3 02/18/2016    A1C 5.0 02/01/2016    A1C 5.0 12/31/2014    A1C 5.3 10/16/2008    A1C 6.8 06/10/2008     Recent Labs   Lab Test  01/15/15   1015  11/10/11   0802  09/30/09   1109   CHOL   --   195  149   HDL   --   68  53   LDL   --   79  78   TRIG  212*  242*  91   CHOLHDLRATIO   --   3.0  2.8       Liver Function Studies -   Recent Labs   Lab Test 07/03/16   PROTTOTAL  5.9   ALBUMIN  3.3   BILITOTAL  0.6   ALKPHOS  86   AST  137   ALT  49       Lab Results   Component Value Date    UCRR 68 05/01/2016    MICROL 6 07/11/2008    UMALCR 4.63 07/11/2008       Last Basic Metabolic Panel:  Lab Results   Component Value Date     07/03/2016      Lab Results   Component Value Date    POTASSIUM 4.1 07/03/2016     Lab Results   Component Value Date    CHLORIDE 111 07/03/2016     Lab Results   Component Value Date    BUN 23 07/03/2016     Lab Results   Component Value Date    CR 1.55 07/03/2016     GFR Estimate   Date Value Ref Range Status   07/03/2016 35 ml/min/1.73m2 Final   05/01/2016 45 (L) >60 mL/min/1.7m2 Final     Comment:     Non  GFR Calc   04/29/2016 42 (L)  >60 mL/min/1.7m2 Final     Comment:     Non  GFR Calc     TSH   Date Value Ref Range Status   07/03/2016 2.92 mcU/mL Final   ]    Most Recent Immunizations   Administered Date(s) Administered     Influenza (IIV3) 09/25/2012     Influenza Vaccine IM 3yrs+ 4 Valent IIV4 10/30/2015     TD (ADULT, 7+) 07/13/2005     TDAP Vaccine (Boostrix) 08/20/2015       ASSESSMENT:                 Current medications were reviewed today.      Medication Adherence: no issues identified    Schizoaffective Disorder, Bipolar Type: Unclear.  I lack the expertise to recommend changes or opportunities for optimization in the current medication regimen. However, it seems likely that stopping all of her medications for her mental health at this time is unsafe.  Will route to Dr. Lozada, her psychiatrist, for his information and to ask that someone reach out to her with further instructions to prevent worsening hallucinations or worsening mental health. However, patient was instructed to resume home psych meds for now.  SCr has returned nearly to baseline.  Do not take risperidone and quetiapine together at bedtime.     Vitamin Deficiency: Needs improvement. Start vitamin D as instructed.     Hypocalcemia: Needs improvement. Take tums twice daily as directed to improve Ca.     PLAN:                Post Discharge Medication Reconciliation Status: discharge medications reconciled and changed, per note/orders (see AVS).    1) Routing chart to Dr. Lozada with hope that he will reach out to the patient with further instructions.   2) Resume home meds prescribed by Dr. Lozada until you hear otherwise.  3) HOLD quetiapine until told otherwise.     I spent 20 minutes with this patient today. I offer these suggestions for consideration by the psychiatrist. A copy of the visit note was provided to the patient's primary care provider.    Will follow up as needed per psychiatrist and patient need.    The patient declined a summary of these  recommendations as an after visit summary.    Kaya Jonse, Pharm.D., [facility]  Medication Therapy Management Pharmacist  Page/VM:  [phone]

## 2017-09-29 LAB
ALBUMIN SERPL-MCNC: 3.4 G/DL (ref 3.4–5)
ALP SERPL-CCNC: 102 U/L (ref 40–150)
ALT SERPL W P-5'-P-CCNC: 56 U/L (ref 0–50)
ANION GAP SERPL CALCULATED.3IONS-SCNC: 6 MMOL/L (ref 3–14)
AST SERPL W P-5'-P-CCNC: 36 U/L (ref 0–45)
BILIRUB SERPL-MCNC: 0.8 MG/DL (ref 0.2–1.3)
BUN SERPL-MCNC: 16 MG/DL (ref 7–30)
CALCIUM SERPL-MCNC: 8.7 MG/DL (ref 8.5–10.1)
CHLORIDE SERPL-SCNC: 108 MMOL/L (ref 94–109)
CO2 SERPL-SCNC: 29 MMOL/L (ref 20–32)
CREAT SERPL-MCNC: 1.02 MG/DL (ref 0.52–1.04)
GFR SERPL CREATININE-BSD FRML MDRD: 56 ML/MIN/1.7M2
GLUCOSE SERPL-MCNC: 106 MG/DL (ref 70–99)
POTASSIUM SERPL-SCNC: 4.3 MMOL/L (ref 3.4–5.3)
PROT SERPL-MCNC: 6.7 G/DL (ref 6.8–8.8)
SODIUM SERPL-SCNC: 143 MMOL/L (ref 133–144)

## 2017-10-02 ENCOUNTER — TELEPHONE (OUTPATIENT)
Dept: FAMILY MEDICINE | Facility: CLINIC | Age: 52
End: 2017-10-02

## 2017-10-02 DIAGNOSIS — E21.3 HYPERPARATHYROIDISM (H): Primary | ICD-10-CM

## 2017-10-02 NOTE — LETTER
"[facility]  [address] [place] [address] [phone]                                                                                                October 10, 2017    Tuan LEGGETT Bari  [address]    [facility] [address]        Dear MsLori Candelaria,    We have been trying to reach you by phone with no success. Your parathyroid hormone level is elevated which is likely related to your recent kidney causing secondary hyperparathyroidism.  You will need to see a hormone doctor for further work up to determine if you have a parathyroid problem. Parathyroid is a little gland inside your thyroid.  I have placed a referral.    Your provider has referred you to: [facility]: Endocrinology and Diabetes Clinic [facility] [phone]   [email or web address]  [facility]: [facility] [phone]   [email or web address]      I would like to see you again in 2 weeks.  Please call to make an appointment. You can schedule a visit by calling [phone] or clicking \"schedule an office visit\" on [employer].       Theresa Salazar D.O.       "

## 2017-10-02 NOTE — TELEPHONE ENCOUNTER
Elevated PTH likely related to her recent ARF causing secondary hyperparathyroidism.  She will need to see endorinology for further work up to determine if she has primary or secondary hyperparathyroidism.  I have placed a referral.  Please notify the patient.  I told her at her last appointment to come in again in two weeks to recheck how she was doing.  This has not been set up yet.  Please have her set this up.  Will repeat the liver enzymes then.      Theresa Salazar D.O.    Comments      Your provider has referred you to: [facility]: Endocrinology and Diabetes Clinic [facility] [phone]   [email or web address]  [facility]: [facility] [phone]   [email or web address]

## 2017-10-03 NOTE — TELEPHONE ENCOUNTER
Attempt # 2    Called patient at home number.     Was call answered?  No.  Unable to leave message.    Chema Shah RN

## 2017-10-04 NOTE — TELEPHONE ENCOUNTER
Called patient's number, it is disconnected. Called emergency contact, Hansa, and left VM. Comments say patient is staying with her as of 5/2016.    Chema Shah RN

## 2017-10-05 NOTE — TELEPHONE ENCOUNTER
Telephone call to Tuan home/cell and it is no longer in service.  Telephone call to emergency contact Hansa.  I asked Hansa to have Tuan call and speak with Dr Salazar' triage nurse and number provided.  April Nance RN

## 2017-10-10 NOTE — TELEPHONE ENCOUNTER
Letter sent. Will try to contact patient in 2 weeks again since I am unsure if she lives at this address currently.    Chema Shah RN

## 2017-12-27 ENCOUNTER — TELEPHONE (OUTPATIENT)
Dept: PSYCHIATRY | Facility: CLINIC | Age: 52
End: 2017-12-27

## 2017-12-27 DIAGNOSIS — F31.9 BIPOLAR I DISORDER (H): ICD-10-CM

## 2017-12-27 DIAGNOSIS — F25.0 SCHIZOAFFECTIVE DISORDER, BIPOLAR TYPE (H): ICD-10-CM

## 2017-12-27 RX ORDER — ESCITALOPRAM OXALATE 20 MG/1
20 TABLET ORAL DAILY
Qty: 90 TABLET | Refills: 0 | Status: SHIPPED | OUTPATIENT
Start: 2017-12-27

## 2017-12-27 RX ORDER — MIRTAZAPINE 15 MG/1
15 TABLET, FILM COATED ORAL AT BEDTIME
Qty: 90 TABLET | Refills: 0 | Status: SHIPPED | OUTPATIENT
Start: 2017-12-27

## 2017-12-27 RX ORDER — LAMOTRIGINE 100 MG/1
TABLET ORAL
Qty: 60 TABLET | Refills: 6 | Status: SHIPPED | OUTPATIENT
Start: 2017-12-27

## 2017-12-27 NOTE — TELEPHONE ENCOUNTER
----- Message from Genet Flanagan sent at 12/27/2017  1:35 PM CST -----  Regarding: Pt message  Tuan stated she is seeing someone else and no longer needs appointments with Dr. Lozada.

## 2018-02-02 ENCOUNTER — TELEPHONE (OUTPATIENT)
Dept: FAMILY MEDICINE | Facility: CLINIC | Age: 53
End: 2018-02-02

## 2018-02-02 NOTE — TELEPHONE ENCOUNTER
Forms received from [facility] [place] [facility]  for Theresa Salazar .  Forms placed in provider 'sign me' folder.  Please mail forms to [facility], [address] [place] [address], [place] [address] after completion.    Mine Purcell  Patient Representative

## 2018-02-12 NOTE — TELEPHONE ENCOUNTER
Mailed form to:  [facility] / [address] [place], [place] [address], [place] [address]    Tasneem Moore CMA    Copy of form put into scanning.

## 2018-08-27 ENCOUNTER — HOSPITAL ENCOUNTER (EMERGENCY)
Facility: CLINIC | Age: 53
Discharge: HOME | End: 2018-08-27
Attending: PSYCHIATRY & NEUROLOGY | Admitting: PSYCHIATRY & NEUROLOGY
Payer: MEDICARE

## 2018-08-27 VITALS
OXYGEN SATURATION: 99 % | HEIGHT: 67 IN | SYSTOLIC BLOOD PRESSURE: 118 MMHG | TEMPERATURE: 98.1 F | WEIGHT: 189.7 LBS | HEART RATE: 71 BPM | BODY MASS INDEX: 29.78 KG/M2 | DIASTOLIC BLOOD PRESSURE: 81 MMHG | RESPIRATION RATE: 16 BRPM

## 2018-08-27 DIAGNOSIS — F25.1 SCHIZOAFFECTIVE DISORDER, DEPRESSIVE TYPE (H): ICD-10-CM

## 2018-08-27 DIAGNOSIS — F25.0 SCHIZOAFFECTIVE DISORDER, BIPOLAR TYPE (H): ICD-10-CM

## 2018-08-27 PROCEDURE — 99285 EMERGENCY DEPT VISIT HI MDM: CPT | Mod: 25 | Performed by: PSYCHIATRY & NEUROLOGY

## 2018-08-27 PROCEDURE — 99284 EMERGENCY DEPT VISIT MOD MDM: CPT | Mod: Z6 | Performed by: PSYCHIATRY & NEUROLOGY

## 2018-08-27 PROCEDURE — 90791 PSYCH DIAGNOSTIC EVALUATION: CPT

## 2018-08-27 RX ORDER — MULTIPLE VITAMINS W/ MINERALS TAB 9MG-400MCG
1 TAB ORAL DAILY
COMMUNITY

## 2018-08-27 RX ORDER — CHLORHEXIDINE GLUCONATE ORAL RINSE 1.2 MG/ML
15 SOLUTION DENTAL 2 TIMES DAILY
COMMUNITY

## 2018-08-27 RX ORDER — HYDROXYZINE HYDROCHLORIDE 25 MG/1
25 TABLET, FILM COATED ORAL EVERY 6 HOURS PRN
Qty: 60 TABLET | Refills: 1 | Status: SHIPPED | OUTPATIENT
Start: 2018-08-27

## 2018-08-27 RX ORDER — FERROUS SULFATE 325(65) MG
325 TABLET ORAL
COMMUNITY

## 2018-08-27 RX ORDER — AMMONIUM LACTATE 12 G/100G
CREAM TOPICAL DAILY PRN
COMMUNITY

## 2018-08-27 ASSESSMENT — ENCOUNTER SYMPTOMS
BACK PAIN: 0
CHEST TIGHTNESS: 0
DIZZINESS: 0
ABDOMINAL PAIN: 0
NERVOUS/ANXIOUS: 1
FEVER: 0
SHORTNESS OF BREATH: 0
DYSPHORIC MOOD: 1
HALLUCINATIONS: 1

## 2018-08-27 NOTE — ED NOTES
Patient lives in [facility]isted [facility]. For transportation home please call Latoya [phone]. Lives in [place], so about 15 minutes Latoya can be here to pick patient up.

## 2018-08-27 NOTE — ED PROVIDER NOTES
"  History     Chief Complaint   Patient presents with     Suicidal     Increasing depression over the last month. Patient mentioned suicidal thoughts to staff today saying \"Sometimes I think about it (SI) sometimes\". Denies SI at this time, but admits to baseline SI thoughts frequently, which is why patient lives in assisted living. Denies current plan. Previous attempt x1, OD.      The history is provided by the patient, medical records and a caregiver.     Tuan Candelaria is a 53 year old female who comes in due to her voicing some suicidal thoughts to a neighbor at her assisted living.  The neighbor got concerned and told the RN on duty.  They asked Tuan to come to the ED for an evaluation.  She states that her suicidal thoughts are chronic and at baseline.  She has no intent or plan to kill herself. She has a history of schizoaffective disorder, depressed type.  She moved into her assisted living 7 months ago.  She likes it except one neighbor that seems too nosy.  This is the neighbor she told about the suicidal thoughts to get her stop bothering her. She hears voices, one good, one bad but usually the good voice wins out. She will hear people knocking or ringing the doorbell at night. She has been feeling more anxious and that \"I'm crawling out of my skin at times.\"  She feels guilty that it was her mother's birthday yesterday and she has not talked to her for 4 years.  She is hoping to get a medicine to help her. She wants to go back home today. [facility]  She is switching to a new psychiatrist.    Please see the 's assessment in EPIC from today (8/27/18) for further details.      I have reviewed the Medications, Allergies, Past Medical and Surgical History, and Social History in the Epic system.    Review of Systems   Constitutional: Negative for fever.   Eyes: Negative for visual disturbance.   Respiratory: Negative for chest tightness and shortness " "of breath.    Cardiovascular: Negative for chest pain.   Gastrointestinal: Negative for abdominal pain.   Musculoskeletal: Negative for back pain.   Neurological: Negative for dizziness.   Psychiatric/Behavioral: Positive for dysphoric mood, hallucinations and suicidal ideas (chronic, passive). Negative for self-injury. The patient is nervous/anxious.    All other systems reviewed and are negative.      Physical Exam   BP: 116/80  Heart Rate: 72  Temp: 97.8  F (36.6  C)  Resp: 16  Height: 170.2 cm (5' 7\")  Weight: 86 kg (189 lb 11.2 oz)  SpO2: 98 %      Physical Exam   Constitutional: She is oriented to person, place, and time. She appears well-developed and well-nourished.   HENT:   Head: Normocephalic and atraumatic.   Eyes: Pupils are equal, round, and reactive to light.   Cardiovascular: Normal rate, regular rhythm and normal heart sounds.    Pulmonary/Chest: Effort normal and breath sounds normal. No respiratory distress.   Musculoskeletal: Normal range of motion.   Neurological: She is alert and oriented to person, place, and time.   Psychiatric: Her speech is normal. Judgment normal. Her mood appears anxious. She is actively hallucinating. Thought content is not paranoid and not delusional. Cognition and memory are normal. She exhibits a depressed mood. She expresses suicidal (chronic, passive) ideation. She expresses no homicidal ideation. She expresses no suicidal plans and no homicidal plans.   Tuan is a 52 y/o female who looks her age.  She is well groomed with good eye contact.   Nursing note and vitals reviewed.      ED Course     ED Course     Procedures               Labs Ordered and Resulted from Time of ED Arrival Up to the Time of Departure from the ED - No data to display         Assessments & Plan (with Medical Decision Making)   Tuan will be discharged home.  She is not an imminent risk to herself or others.  She is at baseline for her hallucinations and suicidal thoughts.  She does have some " higher anxiety and depression which may be related to her neighbor she does not like. She will be given hydroxyzine 25 mg q 6 hours prn for this.  She will follow up with her psychiatrist and she states she is getting a new therapist with the new psychiatrist. She will continue [facility] , [facility].      I have reviewed the nursing notes.    I have reviewed the findings, diagnosis, plan and need for follow up with the patient.    New Prescriptions    HYDROXYZINE (ATARAX) 25 MG TABLET    Take 1 tablet (25 mg) by mouth every 6 hours as needed for anxiety       Final diagnoses:   Schizoaffective disorder, depressive type (H)       8/27/2018   [facility], [place], EMERGENCY DEPARTMENT     Mata Delatorre MD  08/27/18 8860

## 2018-08-27 NOTE — ED AVS SNAPSHOT
[facility], Emergency Department    [address]    [place] [address]    Phone:  [phone]    Fax:  [phone]                                       Tuan Candelaria   MRN: [id number]    Department:  [facility], Emergency Department   Date of Visit:  8/27/2018           Patient Information     Date Of Birth          1965        Your diagnoses for this visit were:     Schizoaffective disorder, depressive type (H)     Schizoaffective disorder, bipolar type (H)        You were seen by Mata Delatorre MD.        Discharge Instructions       Start hydroxyzine 25 mg every 6 hours as needed for anxiety    Follow up with your established providers    24 Hour Appointment Hotline       To make an appointment at any [facility] clinic, call [phone] ([phone]). If you don't have a family doctor or clinic, we will help you find one. [facility] clinics are conveniently located to serve the needs of you and your family.             Review of your medicines      CONTINUE these medicines which may have CHANGED, or have new prescriptions. If we are uncertain of the size of tablets/capsules you have at home, strength may be listed as something that might have changed.        Dose / Directions Last dose taken    hydrOXYzine 25 MG tablet   Commonly known as:  ATARAX   Dose:  25 mg   What changed:    - how much to take  - how to take this  - when to take this  - reasons to take this  - additional instructions   Quantity:  60 tablet        Take 1 tablet (25 mg) by mouth every 6 hours as needed for anxiety   Refills:  1          Our records show that you are taking the medicines listed below. If these are incorrect, please call your family doctor or clinic.        Dose / Directions Last dose taken    ammonium lactate 12 % cream   Commonly known as:  AMLACTIN        Apply topically daily as needed for dry skin   Refills:  0        CALCITRIOL PO   Dose:  0.25 mcg        Take 0.25 mcg by mouth daily    Refills:  0        calcium carbonate 500 MG chewable tablet   Commonly known as:  TUMS   Dose:  1 chew tab        Take 1 chew tab by mouth daily   Refills:  0        chlorhexidine 0.12 % solution   Commonly known as:  PERIDEX   Dose:  15 mL        Swish and spit 15 mLs in mouth 2 times daily   Refills:  0        cholecalciferol 5000 units Caps capsule   Commonly known as:  vitamin D3   Dose:  5000 Units   Quantity:  100 capsule        Take 1 capsule (5,000 Units) by mouth daily Take 1 per day   Refills:  2        cyanocobalamin 2500 MCG sublingual tablet   Commonly known as:  VITAMIN B-12   Dose:  2500 mcg   Quantity:  90 tablet        Place 2,500 mcg under the tongue daily   Refills:  3        escitalopram 20 MG tablet   Commonly known as:  LEXAPRO   Dose:  20 mg   Quantity:  90 tablet        Take 1 tablet (20 mg) by mouth daily One per day   Refills:  0        ferrous sulfate 325 (65 Fe) MG tablet   Commonly known as:  IRON   Dose:  325 mg        Take 325 mg by mouth daily (with breakfast)   Refills:  0        FOLIC ACID PO   Dose:  1 mg        Take 1 mg by mouth daily   Refills:  0        IBUPROFEN PO        Take by mouth every 6 hours as needed for moderate pain   Refills:  0        lamoTRIgine 100 MG tablet   Commonly known as:  LaMICtal   Quantity:  60 tablet        Take 1 per day for a week, then 1 and 1/2 per day for a week, then 2 per day   Refills:  6        mirtazapine 15 MG tablet   Commonly known as:  REMERON   Dose:  15 mg   Quantity:  90 tablet        Take 1 tablet (15 mg) by mouth At Bedtime   Refills:  0        multivitamin, therapeutic with minerals Tabs tablet   Dose:  1 tablet        Take 1 tablet by mouth daily   Refills:  0        * order for DME   Quantity:  1 each        Glucometer, brand as covered by insurance.   Refills:  0        * order for DME   Quantity:  100 each        Test strips for pt's glucometer, brand as covered by insurance. Test daily and prn.   Refills:  4        * order  for DME   Quantity:  100 each        Lancets.  Daily and prn.   Refills:  4        propranolol 80 MG 24 hr capsule   Commonly known as:  INDERAL LA   Dose:  80 mg   Quantity:  90 capsule        Take 1 capsule (80 mg) by mouth daily   Refills:  2        QUETIAPINE FUMARATE PO   Dose:  25 mg        Take 25 mg by mouth At Bedtime   Refills:  0        risperiDONE 1 MG tablet   Commonly known as:  risperDAL   Quantity:  60 tablet        1 to 2 at bedtime as needed   Refills:  2        SODIUM BICARBONATE PO   Dose:  650 mg        Take 650 mg by mouth 2 times daily   Refills:  0        * Notice:  This list has 3 medication(s) that are the same as other medications prescribed for you. Read the directions carefully, and ask your doctor or other care provider to review them with you.            Prescriptions were sent or printed at these locations (1 Prescription)                   Other Prescriptions                Printed at Department/Unit printer (1 of 1)         hydrOXYzine (ATARAX) 25 MG tablet                Orders Needing Specimen Collection     Ordered          08/27/18 1507  Drug abuse screen 6 urine (chem dep) ([facility]) - STAT, [facility]: STAT, Needs to be Collected     Scheduled Task Status   08/27/18 1508 Collect Drug abuse screen 6 urine (chem dep) ([facility]) Open   Order Class:  PCU Collect                  Pending Results     No orders found from 8/25/2018 to 8/28/2018.            Pending Culture Results     No orders found from 8/25/2018 to 8/28/2018.            Pending Results Instructions     If you had any lab results that were not finalized at the time of your Discharge, you can call the ED Lab Result RN at [phone]. You will be contacted by this team for any positive Lab results or changes in treatment. The nurses are available 7 days a week from 10A to 6:30P.  You can leave a message 24 hours per day and they will return your call.        Thank you for choosing [facility]       Thank you for choosing [facility] for  "your care. Our goal is always to provide you with excellent care. Hearing back from our patients is one way we can continue to improve our services. Please take a few minutes to complete the written survey that you may receive in the mail after you visit with us. Thank you!        [employer] Information     [employer] lets you send messages to your doctor, view your test results, renew your prescriptions, schedule appointments and more. To sign up, go to [email or web address] . Click on \"Log in\" on the left side of the screen, which will take you to the Welcome page. Then click on \"Sign up Now\" on the right side of the page.     You will be asked to enter the access code listed below, as well as some personal information. Please follow the directions to create your username and password.     Your access code is: [email or web address]  Expires: 2018  4:29 PM     Your access code will  in 90 days. If you need help or a new code, please call your [facility] or [phone].        Care EveryWhere ID     This is your Care EveryWhere ID. This could be used by other organizations to access your [facility] medical records  [id number]        Equal Access to Services     HOLLIS MASON : Amadou Montgomery, wacourtney mcmanus, qachristina kaalmaelvie canales, manuel orozco. So [facility] [phone].    ATENCIÓN: Si habla español, tiene a dotson disposición servicios gratuitos de asistencia lingüística. Ugo al [phone].    We comply with applicable federal civil rights laws and [place] laws. We do not discriminate on the basis of race, color, national origin, age, disability, sex, sexual orientation, or gender identity.            After Visit Summary       This is your record. Keep this with you and show to your community pharmacist(s) and doctor(s) at your next visit.                  "

## 2018-08-27 NOTE — ED TRIAGE NOTES
"Increasing depression over the last month. Patient mentioned suicidal thoughts to staff today saying \"Sometimes I think about it (SI) sometimes\". Denies SI at this time, but admits to baseline SI thoughts frequently, which is why patient lives in assisted living. Denies current plan. Previous attempt x1, OD. Stressors include Mother's Birthday is coming up. Patient reports baseline frequent SI thoughts since age of 2. Patient states \"This is nothing new\".   "

## 2018-08-27 NOTE — ED NOTES
Pt states she has becoming more depressed. She hasn't felt interacting with others and she has been sleeping more. She feels like she needs a medication adjustment.

## 2018-08-27 NOTE — DISCHARGE INSTRUCTIONS
Start hydroxyzine 25 mg every 6 hours as needed for anxiety    Follow up with your established providers

## 2018-08-27 NOTE — ED NOTES
Writer called Latoya, the Assisted Living Staff who dropped her off. Latoya is on her way to pick patient up and bring her back to the Assisted Living. Patient would like to wait in Main ED lobby for her ride. MD gave the OK and Fern the psych tech will accompany patient to Lobby.

## 2018-08-27 NOTE — ED AVS SNAPSHOT
[facility], [place], Emergency Department    [address]    [facility] [address]    Phone:  [phone]    Fax:  [phone]                                       Tuan Candelaria   MRN: [id number]    Department:  [facility], Emergency Department   Date of Visit:  8/27/2018           After Visit Summary Signature Page     I have received my discharge instructions, and my questions have been answered. I have discussed any challenges I see with this plan with the nurse or doctor.    ..........................................................................................................................................  Patient/Patient Representative Signature      ..........................................................................................................................................  Patient Representative Print Name and Relationship to Patient    ..................................................               ................................................  Date                                            Time    ..........................................................................................................................................  Reviewed by Signature/Title    ...................................................              ..............................................  Date                                                            Time          22EPIC Rev 08/18